# Patient Record
Sex: FEMALE | Race: OTHER | HISPANIC OR LATINO | URBAN - METROPOLITAN AREA
[De-identification: names, ages, dates, MRNs, and addresses within clinical notes are randomized per-mention and may not be internally consistent; named-entity substitution may affect disease eponyms.]

---

## 2023-06-06 ENCOUNTER — EMERGENCY (EMERGENCY)
Facility: HOSPITAL | Age: 33
LOS: 0 days | Discharge: HOME | End: 2023-06-07
Attending: EMERGENCY MEDICINE
Payer: MEDICAID

## 2023-06-06 VITALS
SYSTOLIC BLOOD PRESSURE: 138 MMHG | DIASTOLIC BLOOD PRESSURE: 83 MMHG | WEIGHT: 130.07 LBS | TEMPERATURE: 97 F | HEIGHT: 66 IN | HEART RATE: 104 BPM | RESPIRATION RATE: 18 BRPM | OXYGEN SATURATION: 99 %
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
## 2023-06-06 DIAGNOSIS — S39.012A STRAIN OF MUSCLE, FASCIA AND TENDON OF LOWER BACK, INITIAL ENCOUNTER: ICD-10-CM

## 2023-06-06 DIAGNOSIS — R35.0 FREQUENCY OF MICTURITION: ICD-10-CM

## 2023-06-06 DIAGNOSIS — M54.50 LOW BACK PAIN, UNSPECIFIED: ICD-10-CM
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
## 2023-06-06 DIAGNOSIS — R00.0 TACHYCARDIA, UNSPECIFIED: ICD-10-CM

## 2023-06-06 DIAGNOSIS — N39.0 URINARY TRACT INFECTION, SITE NOT SPECIFIED: ICD-10-CM

## 2023-06-06 DIAGNOSIS — R30.0 DYSURIA: ICD-10-CM

## 2023-06-06 PROCEDURE — 76770 US EXAM ABDO BACK WALL COMP: CPT

## 2023-06-06 PROCEDURE — 81001 URINALYSIS AUTO W/SCOPE: CPT

## 2023-06-06 PROCEDURE — 96372 THER/PROPH/DIAG INJ SC/IM: CPT

## 2023-06-06 PROCEDURE — 99284 EMERGENCY DEPT VISIT MOD MDM: CPT | Mod: 25

## 2023-06-06 PROCEDURE — 87086 URINE CULTURE/COLONY COUNT: CPT

## 2023-06-06 PROCEDURE — 99284 EMERGENCY DEPT VISIT MOD MDM: CPT

## 2023-06-06 NOTE — ED ADULT TRIAGE NOTE - CHIEF COMPLAINT QUOTE
pt presents to the ED in tears in a wheel chair c/o 10/10 back pain. as per pt pain began 1 week ago but has worsened. no injury or trauma reported. no hx of back issues. pt A&Ox4 - noted to be emotional

## 2023-06-07 VITALS
SYSTOLIC BLOOD PRESSURE: 128 MMHG | DIASTOLIC BLOOD PRESSURE: 68 MMHG | RESPIRATION RATE: 18 BRPM | OXYGEN SATURATION: 100 % | HEART RATE: 80 BPM | TEMPERATURE: 98 F

## 2023-06-07 LAB
APPEARANCE UR: CLEAR — SIGNIFICANT CHANGE UP
BACTERIA # UR AUTO: ABNORMAL
BILIRUB UR-MCNC: NEGATIVE — SIGNIFICANT CHANGE UP
COLOR SPEC: YELLOW — SIGNIFICANT CHANGE UP
DIFF PNL FLD: NEGATIVE — SIGNIFICANT CHANGE UP
EPI CELLS # UR: SIGNIFICANT CHANGE UP
GLUCOSE UR QL: NEGATIVE — SIGNIFICANT CHANGE UP
KETONES UR-MCNC: NEGATIVE — SIGNIFICANT CHANGE UP
LEUKOCYTE ESTERASE UR-ACNC: ABNORMAL
NITRITE UR-MCNC: NEGATIVE — SIGNIFICANT CHANGE UP
PH UR: 7 — SIGNIFICANT CHANGE UP (ref 5–8)
PROT UR-MCNC: NEGATIVE — SIGNIFICANT CHANGE UP
RBC CASTS # UR COMP ASSIST: SIGNIFICANT CHANGE UP /HPF (ref 0–4)
SP GR SPEC: 1.01 — SIGNIFICANT CHANGE UP (ref 1.01–1.02)
UROBILINOGEN FLD QL: NEGATIVE — SIGNIFICANT CHANGE UP
WBC UR QL: SIGNIFICANT CHANGE UP /HPF (ref 0–5)

## 2023-06-07 PROCEDURE — 76770 US EXAM ABDO BACK WALL COMP: CPT | Mod: 26

## 2023-06-07 RX ORDER — KETOROLAC TROMETHAMINE 30 MG/ML
30 SYRINGE (ML) INJECTION ONCE
Refills: 0 | Status: DISCONTINUED | OUTPATIENT
Start: 2023-06-07 | End: 2023-06-07

## 2023-06-07 RX ORDER — METHOCARBAMOL 500 MG/1
2 TABLET, FILM COATED ORAL
Qty: 24 | Refills: 0
Start: 2023-06-07 | End: 2023-06-10

## 2023-06-07 RX ORDER — ACETAMINOPHEN 500 MG
1000 TABLET ORAL ONCE
Refills: 0 | Status: COMPLETED | OUTPATIENT
Start: 2023-06-07 | End: 2023-06-07

## 2023-06-07 RX ORDER — CEPHALEXIN 500 MG
1 CAPSULE ORAL
Qty: 14 | Refills: 0
Start: 2023-06-07 | End: 2023-06-13

## 2023-06-07 RX ORDER — CEPHALEXIN 500 MG
500 CAPSULE ORAL ONCE
Refills: 0 | Status: COMPLETED | OUTPATIENT
Start: 2023-06-07 | End: 2023-06-07

## 2023-06-07 RX ORDER — PHENAZOPYRIDINE HCL 100 MG
1 TABLET ORAL
Qty: 6 | Refills: 0
Start: 2023-06-07 | End: 2023-06-09

## 2023-06-07 RX ORDER — METHOCARBAMOL 500 MG/1
1000 TABLET, FILM COATED ORAL ONCE
Refills: 0 | Status: COMPLETED | OUTPATIENT
Start: 2023-06-07 | End: 2023-06-07

## 2023-06-07 RX ADMIN — METHOCARBAMOL 1000 MILLIGRAM(S): 500 TABLET, FILM COATED ORAL at 02:54

## 2023-06-07 RX ADMIN — Medication 30 MILLIGRAM(S): at 02:11

## 2023-06-07 RX ADMIN — Medication 500 MILLIGRAM(S): at 03:39

## 2023-06-07 RX ADMIN — Medication 1000 MILLIGRAM(S): at 02:09

## 2023-06-07 NOTE — ED ADULT NURSE NOTE - NSFALLRISKINTERV_ED_ALL_ED
Assistance OOB with selected safe patient handling equipment if applicable/Assistance with ambulation/Communicate fall risk and risk factors to all staff, patient, and family/Monitor gait and stability/Provide visual cue: yellow wristband, yellow gown, etc/Reinforce activity limits and safety measures with patient and family/Call bell, personal items and telephone in reach/Instruct patient to call for assistance before getting out of bed/chair/stretcher/Non-slip footwear applied when patient is off stretcher/[place] to call system/Physically safe environment - no spills, clutter or unnecessary equipment/Purposeful Proactive Rounding/Room/bathroom lighting operational, light cord in reach

## 2023-06-07 NOTE — ED PROVIDER NOTE - PHYSICAL EXAMINATION
CONSTITUTIONAL: Well appearing, awake, alert, oriented to person, place, time/situation and in no apparent distress.  · ENMT: Airway patent, Nasal mucosa clear. Mouth with normal mucosa. Throat has no vesicles, no oropharyngeal exudates and uvula is midline.  · EYES: Clear bilaterally, pupils equal, round and reactive to light.  · CARDIAC: tachycardia,, regular rhythm.  Heart sounds S1, S2.  No murmurs, rubs or gallops.  · RESPIRATORY: Breath sounds clear and equal bilaterally.  · GASTROINTESTINAL: Abdomen soft, non-tender, no guarding.  · MUSCULOSKELETAL: Spine appears normal,   Lumbar spine range of motion is limited in flexion and extension.  The patient has pain with flexion, extension, sidebending and rotation.  The patient has decreased straight leg raise of the left lower extremity and pain with passive straight leg raise that is limited to the left lower back and buttocks.  · NEUROLOGICAL: Alert and oriented, no focal deficits, no motor or sensory deficits.  · SKIN: Skin normal color for race, warm, dry and intact. No evidence of rash

## 2023-06-07 NOTE — ED PROVIDER NOTE - NSFOLLOWUPCLINICS_GEN_ALL_ED_FT
[facility]  Family Medicine  [address] [place] [address]  Phone: [phone]  Fax:   Follow Up Time: 1-3 Days

## 2023-06-07 NOTE — ED PROVIDER NOTE - OBJECTIVE STATEMENT
32-year-old female no pertinent past medical or surgical history presents for progressively worsening low back pain over the past week.  Patient denies any recent trauma or heavy lifting.  The patient states that this has gradually worsened and is exacerbated by moving and over the past few hours she is unable to ambulate.  The patient denies any numbness/tingling, incontinence or urinary retention.  The patient does note some dysuria and frequency.  The patient states that she has some suprapubic pain as well.  Patient has had nausea without vomiting.  Patient has been afebrile.  Patient has no history of tobacco, alcohol or drug use.  Patient has not taken any medication for the pain in the last 24 hours.

## 2023-06-07 NOTE — ED ADULT NURSE NOTE - OBJECTIVE STATEMENT
Pt is a 32 YOF complaining of back pain general. Pt states "It really hurts on my L side I cant sit down". Pt is GCS 15, HEENT clear, PERRL, A & O x4, PMS x4. Pt endorses 7/10 pain. Pt reports dark urine. Pt denies chest pain SOB. Pt safety is maintained bed in lowest position semi fowlers with bed rails up.

## 2023-06-07 NOTE — ED PROVIDER NOTE - PATIENT PORTAL LINK FT
You can access the FollowMyHealth Patient Portal offered by [facility] by registering at the following website: [email or web address]. By joining [employer]’s FollowMyHealth portal, you will also be able to view your health information using other applications (apps) compatible with our system.

## 2023-06-07 NOTE — ED PROVIDER NOTE - CLINICAL SUMMARY MEDICAL DECISION MAKING FREE TEXT BOX
No red flags noted to suggest cauda equina syndrome, epidural abscess, or epidural hematoma.   Patient complaining of some dysuria and frequency and has mild suprapubic tenderness.  Will check urinalysis/culture for urinary tract infection and obtain ultrasound of kidneys and bladder.  Will treat pain with NSAIDs, muscle relaxers and reassess. Johnny Starr, DO

## 2023-06-07 NOTE — ED PROVIDER NOTE - NSFOLLOWUPINSTRUCTIONS_ED_ALL_ED_FT
Infección urinaria en los adultos  Urinary Tract Infection, Adult       Tresa infección urinaria (IU) puede ocurrir en cualquier lugar de las vías urinarias. Las vías urinarias incluyen a los riñones, los uréteres, la vejiga y la uretra. Estos órganos fabrican, almacenan y eliminan la orina del organismo.    El médico puede usar otras palabras para describir la infección. La IU antonino afecta los uréteres y a los riñones (pielonefritis). La IU baja afecta la vejiga (cistitis) y la uretra (uretritis).    ¿Cuáles son las causas?  La mayoría de las infecciones de las vías urinarias es causada por la presencia de bacterias en la boby genital, alrededor de la entrada de las vías urinarias (uretra). Estas bacterias proliferan y causan inflamación en las vías urinarias.    ¿Qué incrementa el riesgo?  Es más probable que contraiga esta afección si:    Tiene colocado un catéter urinario (sonda urinaria) permanente.  No puede controlar cuándo orinar o defecar (tiene incontinencia).  Es saloni y usted:    Utiliza espermicida o diafragma gatito método anticonceptivo.  Tiene niveles bajos de estrógenos.  Están embarazadas.  Tiene ciertos genes que aumentan duke riesgo (genética).  Es sexualmente activa.  Mercedes antibióticos.  Tiene tresa afección que causa que el flujo de orina sea lento, gatito:    Próstata agrandada, si usted es hombre.  Obstrucción de la uretra (estenosis).  Cálculo renal.  Tresa afección nerviosa que afecta el control de la vejiga (vejiga neurógena).  No ken lo suficiente o no orina con frecuencia.  Tiene ciertas enfermedades crónicas, gatito:    Diabetes.  Un sistema que combate las enfermedades (sistemainmunitario) debilitado.  Anemia drepanocítica.  Gota.  Lesión en la médula bermeo.    ¿Cuáles son los signos o los síntomas?  Los síntomas de esta afección incluyen:    Necesidad inmediata (urgente) de orinar.  Micción frecuente o eliminación de pequeñas cantidades de orina con frecuencia.  Ardor o dolor al orinar.  Presencia de joelle en la orina.  Orina con mal olor u olor atípico.  Dificultad para orinar.  Orina turbia.  Secreción vaginal, si es saloni.  Dolor en el abdomen o en la parte inferior de la espalda.    Es posible que también tenga:    Vómitos o disminución del apetito.  Confusión.  Irritabilidad o cansancio.  Fiebre.  Diarrea.    El primer síntoma en los adultos mayores puede ser la confusión. En algunos casos, es posible que no tengan síntomas hasta que la infección empeore.    ¿Cómo se diagnostica?  Esta afección se diagnostica en función de sandee antecedentes médicos y de un examen físico. También pueden hacerle otras pruebas, incluidas las siguientes:    Análisis de orina.  Análisis de joelle.  Pruebas de infecciones de transmisión sexual (ITS).    Si ha tenido más de tresa infección urinaria (IU), se pueden hacer estudios de diagnóstico por imágenes o tresa cistoscopia para determinar la causa de las infecciones.    ¿Cómo se trata?  El tratamiento de esta afección incluye lo siguiente:    Antibióticos.  Medicamentos de venta sada para aliviar las molestias.  Beber tresa cantidad suficiente agua para mantenerse hidratado.    Si tiene infecciones con frecuencia o tiene otras afecciones, gatito un cálculo renal, es posible que deba brice a un médico especialista en las vías urinarias (urólogo).    En casos poco frecuentes, las infecciones urinarias pueden provocar sepsis. La sepsis es tresa afección potencialmente mortal que se produce cuando el cuerpo responde a tresa infección. La sepsis se trata en el hospital con antibióticos, líquidos y otros medicamentos que se administran por vía intravenosa.    Sigue estas instrucciones en tu casa:         Medicamentos    Mercedes los medicamentos de venta sada y los recetados solamente gatito se lo haya indicado el médico.  Si le recetaron un antibiótico, tómelo gatito se lo haya indicado el médico. No deje de usar el antibiótico aunque comience a sentirse mejor.        Indicaciones generales    Asegúrese de hacer lo siguiente:    Vaciar la vejiga con frecuencia y en duke totalidad. No contener la orina magnus largos períodos.  Vaciar la vejiga después de tener sexo.  Limpiarse de adelante hacia atrás después de defecar, si es saloni. Use cada trozo de papel higiénico solo tresa vez cuando se limpie.  Amy suficiente líquido gatito para mantener la orina de color amarillo pálido.  Concurrir a todas las visitas de seguimiento gatito se lo haya indicado el médico. [place] es importante.    Comunícate con un médico si:  Los síntomas no mejoran después de 1 o 2 días de tratamiento.  Los síntomas desaparecen y luego vuelven a aparecer.    Solicite ayuda inmediatamente si tiene:  Dolor intenso en la espalda o en la parte inferior del abdomen.  Fiebre.  Náuseas o vómitos.    Resumen  Tresa infección urinaria (IU) es tresa infección en cualquier parte de las vías urinarias, que incluyen los riñones, los uréteres, la vejiga y la uretra.  La mayoría de las infecciones de las vías urinarias es causada por la presencia de bacterias en la boby genital, alrededor de la entrada de las vías urinarias (uretra).  El tratamiento de esta afección suele incluir antibióticos.  Si le recetaron un antibiótico, tómelo gatito se lo haya indicado el médico. No deje de usar el antibiótico aunque comience a sentirse mejor.  Concurrir a todas las visitas de seguimiento gatito se lo haya indicado el médico. [place] es importante.    NOTAS ADICIONALES E INSTRUCCIONES    Please follow up with your Primary MD in 24-48 hr.  Seek immediate medical care for any new/worsening signs or symptoms.

## 2023-06-08 LAB
CULTURE RESULTS: SIGNIFICANT CHANGE UP
SPECIMEN SOURCE: SIGNIFICANT CHANGE UP

## 2023-12-06 ENCOUNTER — EMERGENCY (EMERGENCY)
Facility: HOSPITAL | Age: 33
LOS: 0 days | Discharge: HOME | End: 2023-12-06
Attending: EMERGENCY MEDICINE
Payer: MEDICAID

## 2023-12-06 VITALS
SYSTOLIC BLOOD PRESSURE: 124 MMHG | RESPIRATION RATE: 18 BRPM | DIASTOLIC BLOOD PRESSURE: 90 MMHG | OXYGEN SATURATION: 98 % | HEART RATE: 80 BPM | TEMPERATURE: 99 F

## 2023-12-06 VITALS — WEIGHT: 138.01 LBS | HEIGHT: 62.99 IN

## 2023-12-06 DIAGNOSIS — E66.9 OBESITY, UNSPECIFIED: ICD-10-CM

## 2023-12-06 DIAGNOSIS — M54.50 LOW BACK PAIN, UNSPECIFIED: ICD-10-CM

## 2023-12-06 PROBLEM — Z78.9 OTHER SPECIFIED HEALTH STATUS: Chronic | Status: ACTIVE | Noted: 2023-06-07

## 2023-12-06 LAB
APPEARANCE UR: CLEAR — SIGNIFICANT CHANGE UP
APPEARANCE UR: CLEAR — SIGNIFICANT CHANGE UP
BILIRUB UR-MCNC: NEGATIVE — SIGNIFICANT CHANGE UP
BILIRUB UR-MCNC: NEGATIVE — SIGNIFICANT CHANGE UP
COLOR SPEC: YELLOW — SIGNIFICANT CHANGE UP
COLOR SPEC: YELLOW — SIGNIFICANT CHANGE UP
DIFF PNL FLD: NEGATIVE — SIGNIFICANT CHANGE UP
DIFF PNL FLD: NEGATIVE — SIGNIFICANT CHANGE UP
GLUCOSE UR QL: NEGATIVE MG/DL — SIGNIFICANT CHANGE UP
GLUCOSE UR QL: NEGATIVE MG/DL — SIGNIFICANT CHANGE UP
KETONES UR-MCNC: NEGATIVE MG/DL — SIGNIFICANT CHANGE UP
KETONES UR-MCNC: NEGATIVE MG/DL — SIGNIFICANT CHANGE UP
LEUKOCYTE ESTERASE UR-ACNC: NEGATIVE — SIGNIFICANT CHANGE UP
LEUKOCYTE ESTERASE UR-ACNC: NEGATIVE — SIGNIFICANT CHANGE UP
NITRITE UR-MCNC: NEGATIVE — SIGNIFICANT CHANGE UP
NITRITE UR-MCNC: NEGATIVE — SIGNIFICANT CHANGE UP
PH UR: 6.5 — SIGNIFICANT CHANGE UP (ref 5–8)
PH UR: 6.5 — SIGNIFICANT CHANGE UP (ref 5–8)
PROT UR-MCNC: NEGATIVE MG/DL — SIGNIFICANT CHANGE UP
PROT UR-MCNC: NEGATIVE MG/DL — SIGNIFICANT CHANGE UP
SP GR SPEC: 1.02 — SIGNIFICANT CHANGE UP (ref 1–1.03)
SP GR SPEC: 1.02 — SIGNIFICANT CHANGE UP (ref 1–1.03)
UROBILINOGEN FLD QL: 1 MG/DL — SIGNIFICANT CHANGE UP (ref 0.2–1)
UROBILINOGEN FLD QL: 1 MG/DL — SIGNIFICANT CHANGE UP (ref 0.2–1)

## 2023-12-06 PROCEDURE — 99284 EMERGENCY DEPT VISIT MOD MDM: CPT

## 2023-12-06 PROCEDURE — 81003 URINALYSIS AUTO W/O SCOPE: CPT

## 2023-12-06 PROCEDURE — 99283 EMERGENCY DEPT VISIT LOW MDM: CPT

## 2023-12-06 PROCEDURE — 87086 URINE CULTURE/COLONY COUNT: CPT

## 2023-12-06 RX ORDER — CYCLOBENZAPRINE HYDROCHLORIDE 10 MG/1
1 TABLET, FILM COATED ORAL
Qty: 15 | Refills: 0
Start: 2023-12-06 | End: 2023-12-10

## 2023-12-06 RX ORDER — IBUPROFEN 200 MG
600 TABLET ORAL ONCE
Refills: 0 | Status: COMPLETED | OUTPATIENT
Start: 2023-12-06 | End: 2023-12-06

## 2023-12-06 RX ORDER — IBUPROFEN 200 MG
1 TABLET ORAL
Qty: 16 | Refills: 0
Start: 2023-12-06 | End: 2023-12-09

## 2023-12-06 RX ORDER — CYCLOBENZAPRINE HYDROCHLORIDE 10 MG/1
5 TABLET, FILM COATED ORAL ONCE
Refills: 0 | Status: COMPLETED | OUTPATIENT
Start: 2023-12-06 | End: 2023-12-06

## 2023-12-06 RX ADMIN — CYCLOBENZAPRINE HYDROCHLORIDE 5 MILLIGRAM(S): 10 TABLET, FILM COATED ORAL at 18:59

## 2023-12-06 RX ADMIN — Medication 600 MILLIGRAM(S): at 18:59

## 2023-12-06 NOTE — ED STATDOCS - MUSCULOSKELETAL, MLM
L sciatic notch TTP. No midline back pain. SLR 35 degrees w/ pain lower back radiating to the LLE. No obvious hip swelling or TTP. No LE focal TTP or swelling. + Tender L para-lumbar mm. & L sciatic notch. No midline back pain. SLR 35 degrees w/ pain lower back radiating into LLE. No obvious L hip swelling or TTP. No LE focal TTP or swelling.

## 2023-12-06 NOTE — ED STATDOCS - ATTENDING APP SHARED VISIT CONTRIBUTION OF CARE
ED Attending Contribution to Care: VERNON HOYT MD,  performed the initial face to face bedside interview with this patient regarding history of present illness, review of symptoms and relevant past medical, social and family history.  I completed an independent physical examination.  I was the initial provider who evaluated this patient. I have signed out the follow up of any pending tests (i.e. labs, radiological studies) to the MALLY.  I have communicated the patient’s plan of care and disposition with the MALLY.  The history, relevant review of systems, past medical and surgical history, medical decision making, and physical examination was documented by the scribe in my presence and I attest to the accuracy of the documentation.

## 2023-12-06 NOTE — ED STATDOCS - CONSTITUTIONAL, MLM
well appearing and in no apparent distress. normal... Obese HF, no respiratory discomfort.  + Mild pain d/t LBP.

## 2023-12-06 NOTE — ED STATDOCS - OBJECTIVE STATEMENT
33y Greenlandic speaking female w/o presents to the ED c/o L lower back/buttock pain radiating to the L lateral LE for the past week w/o known trauma/injury. Denies heavy lifting. States that the pain is aggravated by moving, standing, and walking. Denies distal numbness, weakness, or LE giving out. Denies fever, urinary symptoms, chills, abdominal pain, or n/v. Friend at bedside states patient diagnosed w/ UTI w/ similar symptom presentation. 33y Bengali speaking female w/o presents to the ED c/o L lower back/buttock pain radiating to the L lateral LE for the past week w/o known trauma/injury. Denies heavy lifting. States that the pain is aggravated by moving, standing, and walking. Denies distal numbness, weakness, or LE giving out. Denies fever, urinary symptoms, chills, abdominal pain, or n/v. Friend at bedside states patient diagnosed w/ UTI w/ similar symptom presentation. 33y Yi speaking HF w/o PMH, BIB pvt car to the ED c/o L lower back/buttock pain radiating along lateral LLE for the past week w/o known trauma/injury. Denies heavy lifting. States that the pain is aggravated by moving, standing, and walking. Denies distal numbness, weakness, or LE giving out. Denies fever, urinary symptoms, chills, abdominal pain, B/B incontinence, nor n/v. Friend at bedside states patient previously diagnosed w/ UTI w/ similar symptoms  presentation. 33y Frisian speaking HF w/o PMH, BIB pvt car to the ED c/o L lower back/buttock pain radiating along lateral LLE for the past week w/o known trauma/injury. Denies heavy lifting. States that the pain is aggravated by moving, standing, and walking. Denies distal numbness, weakness, or LE giving out. Denies fever, urinary symptoms, chills, abdominal pain, B/B incontinence, nor n/v. Friend at bedside states patient previously diagnosed w/ UTI w/ similar symptoms  presentation.

## 2023-12-06 NOTE — ED ADULT TRIAGE NOTE - CHIEF COMPLAINT QUOTE
presents to ER with c/o pain in left leg and hip. denies known trauma or injury. pain is present at rest, alleviated upon movement.

## 2023-12-06 NOTE — ED STATDOCS - CARDIAC, MLM
normal rate, regular rhythm, and no murmur. normal rate, regular rhythm, and no murmur.  Normal radial pulse.

## 2023-12-06 NOTE — ED ADULT TRIAGE NOTE - PATIENT/CAREGIVER ACCEPTED INTERPRETER SERVICES
Date of Service: 01/09/2018    HISTORY OF PRESENT ILLNESS:  I saw Tona Moy in followup.  She is sitting in the chair and does not appear to be in any discomfort.  She has not had any further spells concerning for seizures since my last evaluation.  She was seen by a speech pathologist and started on a puree diet and honey-thick liquids.  The patient denies any headaches.  She denies any significant weakness.  She wants to go home.  Per patient's nurse, she is able to transfer from wheelchair to the bed.  The patient's daughter is at bedside, who reported that patient can be really cochran and stubborn at times.  The patient is asking whether I can continue following her up in [place], where she lives.  She had received another dose of Keppra this morning, 500 mg intravenously, and has been tolerating this pretty well.  She has been afebrile.    VITAL SIGNS:    Blood pressure is 104/60 mmHg, pulse is 78 per minute, temperature 98.2 degrees Fahrenheit.    MEDICATIONS:  1.  Lipitor.  2.  Coreg.  3.  Senokot.  4.  Keppra 500 mg twice a day.  5.  Synthroid.  6.  Nystatin.    NEUROLOGIC EXAMINATION:  The patient is awake, alert, oriented times hospital.  She is able to tell me her correct date of birth.  She is able to name simple objects.  She is slow in processing information.  Pupils are 2 mm, round, reactive to light.  Visual fields are full to finger confrontation.  Extraocular movements are intact.  There is questionable visual neglect noted on the left.  No significant nystagmus noted.  Nasolabial folds are essentially symmetric.  Tongue is midline.  There is no pronator drift noted.  She did not give good effort to really great strength in upper and lower extremities but grossly she is moving both upper and lower extremities pretty well.  She had some difficulty following commands for finger-to-nose examination.  With 2-hand assist, she is able to get up and walk a few steps.  Her gait is slightly cautious.  I  noted mild leaning towards the left.    ASSESSMENT AND PLAN:  Tona Moy is a pleasant 80-year-old  woman with a large right subdural hematoma, status post craniotomy for evacuation.  Yesterday she had a witnessed generalized tonic-clonic seizure.  She was loaded with IV Keppra and started on maintenance dose of 500 mg twice a day of this morning, which she is tolerating pretty well.  She has not had any further seizures since yesterday evening.  No side effects to Keppra at this point in time.  Her neurological examination as described above with mild slowing in processing information, mild left visual neglect, and mild leaning towards the left during ambulation.  She probably has very mild weakness on the left side and I could not grade strength because of patient not being really cooperative with motor evaluation.      I have discussed at length with the patient's daughter, who is present at the bedside about the pathophysiology, prognosis, and treatment options for subdural hematoma.  She has a history of atrial fibrillation.  Given risk of falls and left subdural hematoma requiring craniotomy for evacuation, she is a high risk candidate for long-term anticoagulation therapy.  If this subdural hematoma completely resolves, then she can be started on antiplatelet therapy in about 3 months, and possibly in the future, dual antiplatelet, but I would not suggest Warfarin/noval anticoagulant in the future.  She probably needs to continue on Keppra for about 1-2 years.  If she happens to experience any further seizures while she is in the hospital, then I would consider video EEG monitoring and possibly increasing the dose of Keppra/adding another anticonvulsant.    Thank you for the opportunity to continue to participate in the care of this nice woman.  Above findings are also discussed with the patient's nurse, who was present at the bedside.      Dictated By: Jacob Broussard MD  Signing Provider: Jacob JONES  MD RAYRAY Broussard/ROMAINE ([id number])  DD: 01/09/2018 18:41:26 TD: 01/09/2018 18:59:21    Copy Sent To:      yes

## 2023-12-06 NOTE — ED STATDOCS - SKIN, MLM
skin normal color for race, warm, dry and intact. skin normal color for race, warm, dry and intact.  No tactile warmth.

## 2023-12-06 NOTE — ED STATDOCS - CLINICAL SUMMARY MEDICAL DECISION MAKING FREE TEXT BOX
33y Wolof speaking female w/ L lower back/buttock pain radiating to the L lateral LE for the past week w/o known trauma/injury. Plan urine for UCG, UA w/ culture, PO Motrin/Flexiril. Observe and reassess, plan for ambulation trial. 33y Telugu speaking female w/ L lower back/buttock pain radiating to the L lateral LE for the past week w/o known trauma/injury. Plan urine for UCG, UA w/ culture, PO Motrin/Flexiril. Observe and reassess, plan for ambulation trial. 33y Hungarian speaking female w/ L lower back/buttock pain radiating along lateral LLE for the past week w/o known trauma/injury.   Plan: urine for UCG, UA w/ culture, PO Motrin/Flexiril. Observe and reassess, plan for ambulation trial.    See Progress Notes for ED PA's case progression incl. labs review & pt reassessment, disposition. 33y Frisian speaking female w/ L lower back/buttock pain radiating along lateral LLE for the past week w/o known trauma/injury.   Plan: urine for UCG, UA w/ culture, PO Motrin/Flexiril. Observe and reassess, plan for ambulation trial.    See Progress Notes for ED PA's case progression incl. labs review & pt reassessment, disposition.

## 2023-12-06 NOTE — ED STATDOCS - PATIENT PORTAL LINK FT
You can access the FollowMyHealth Patient Portal offered by [facility] by registering at the following website: [email or web address]. By joining [employer]’s FollowMyHealth portal, you will also be able to view your health information using other applications (apps) compatible with our system. You can access the FollowMyHealth Patient Portal offered by [facility] by registering at the following website: [email or web address]. By joining [employer]’s FollowMyHealth portal, you will also be able to view your health information using other applications (apps) compatible with our system.

## 2023-12-06 NOTE — ED ADULT NURSE NOTE - NSFALLUNIVINTERV_ED_ALL_ED
Bed/Stretcher in lowest position, wheels locked, appropriate side rails in place/Call bell, personal items and telephone in reach/Instruct patient to call for assistance before getting out of bed/chair/stretcher/Non-slip footwear applied when patient is off stretcher/[place] to call system/Physically safe environment - no spills, clutter or unnecessary equipment/Purposeful proactive rounding/Room/bathroom lighting operational, light cord in reach Bed/Stretcher in lowest position, wheels locked, appropriate side rails in place/Call bell, personal items and telephone in reach/Instruct patient to call for assistance before getting out of bed/chair/stretcher/Non-slip footwear applied when patient is off stretcher/[place] to call system/Physically safe environment - no spills, clutter or unnecessary equipment/Purposeful proactive rounding/Room/bathroom lighting operational, light cord in reach

## 2023-12-06 NOTE — ED ADULT NURSE NOTE - OBJECTIVE STATEMENT
presents to ER with c/o pain in left leg and hip. denies known trauma or injury. pain is present at rest, alleviated upon movement. no s/s of distress. Pt A&ox4, ambulatory.

## 2023-12-06 NOTE — ED STATDOCS - RESPIRATORY, MLM
breath sounds clear and equal bilaterally. breath sounds clear and equal bilaterally.  Normal respirations.

## 2023-12-06 NOTE — ED STATDOCS - NSFOLLOWUPINSTRUCTIONS_ED_ALL_ED_FT
Ciática  Sciatica  Back view of a person showing a normal leg and a leg affected by the pain of sciatica.  La ciática es el dolor, entumecimiento, debilidad u hormigueo a lo nereyda del nervio ciático. El nervio ciático comienza en la parte inferior de la espalda y desciende por la parte posterior de cada pierna. Controla los músculos en la parte inferior de las piernas y en la parte posterior de las rodillas. También proporciona sensibilidad a la parte posterior de los muslos, la parte inferior de las piernas y la planta de los pies. La ciática es un síntoma de otra afección que ejerce presión o “pellizca” el nervio ciático.    Con mayor frecuencia, la ciática afecta a un solo lado del cuerpo. Suele desaparecer por sí phong o con tratamiento. En algunos casos, la ciática puede volver (ser recurrente).    ¿Cuáles son las causas?  Esta afección es causada por nohemi presión sobre el nervio ciático o “pellizco” del nervio ciático. [place] puede ser el resultado de:  Un disco que sobresale demasiado entre los huesos de la columna vertebral (hernia de disco).  Cambios en los discos vertebrales relacionados con la edad.  Un trastorno doloroso que afecta un músculo de las nalgas.  Un crecimiento óseo adicional cerca del nervio ciático.  Nohemi rotura (fractura) de la pelvis.  Embarazo.  Tumor. [place] es poco frecuente.  ¿Qué incrementa el riesgo?  Los siguientes factores pueden hacer que sea más propenso a desarrollar esta afección:  Practicar deportes que ponen presión o tensión sobre la columna vertebral.  Tener poca fuerza y flexibilidad.  Antecedentes de cirugía o lesión en la espalda.  Estar sentado lizette largos períodos de tiempo.  Realizar actividades que requieren agacharse o levantar objetos en forma repetida.  Obesidad.  ¿Cuáles son los signos o síntomas?  Los síntomas pueden variar de leves a muy graves. Pueden incluir los siguientes:  Cualquiera de los siguientes problemas en la parte inferior de la espalda, las piernas, la cadera o las nalgas:  Hormigueo leve, adormecimiento o dolor sordo.  Sensación de ardor.  Dolor rose.  Adormecimiento de la parte posterior de la pantorrilla o la planta del pie.  Debilidad en las piernas.  Dolor de espalda intenso que dificulta el movimiento.  Los síntomas podrían empeorar al toser, estornudar o reírse, o cuando se está sentado o de pie lizette períodos prolongados.    ¿Cómo se diagnostica?  Esta afección se puede diagnosticar en función de lo siguiente:  Los síntomas y los antecedentes médicos.  Un examen físico.  Pruebas de joelle.  Pruebas de diagnóstico por imágenes, por ejemplo:  Radiografías.  Nohemi resonancia magnética (RM).  Nohemi exploración por tomografía computarizada (TC).  ¿Cómo se trata?  En muchos casos, esta afección mejora por sí phong, sin tratamiento. Sin embargo, el tratamiento puede incluir:  Reducción o modificación la actividad física.  Hacer ejercicio, incluido el fortalecimiento y la elongación.  Aplicación de calor o hielo en la boby afectada.  Medicamentos para lo siguiente:  Aliviar el dolor y la inflamación.  Relajan los músculos.  Medicamentos inyectables que ayudan a aliviar el dolor y la inflamación (corticoesteroides) alrededor del nervio ciático.  Cirugía.  Siga estas instrucciones en [facility]:  Medicamentos    Use los medicamentos de venta sada y los recetados solamente gatito se lo haya indicado el médico.  Pregúntele al médico si el medicamento recetado le impide conducir o usar maquinaria pesada.  Control del dolor    Bag of ice on a towel on the skin.  A heating pad for use on the affected area.  Si se lo indican, aplique hielo sobre la boby afectada. Para hacer esto:  Ponga el hielo en noehmi bolsa plástica.  Coloque nohemi toalla entre la piel y la bolsa.  Aplique el hielo lizette 20 minutos, 2 a 3 veces por día.  Si la piel se le pone de color marion brillante, retire el hielo de inmediato para evitar daños en la piel. El riesgo de daño en la piel es mayor si no puede sentir dolor, calor o frío.  Si se lo indican, aplique calor en la boby afectada con la frecuencia que le haya indicado el médico. Use la rambo de calor que el médico le recomiende, gatito nohemi compresa de calor húmedo o nohemi almohadilla térmica.  Coloque nohemi toalla entre la piel y la rambo de calor.  Aplique calor lizette 20 a 30 minutos.  Si la piel se le pone de color marion brillante, retire el calor de inmediato para evitar quemaduras. El riesgo de quemaduras es mayor si no puede sentir el dolor, el calor o el frío.  Actividad    A comparison showing the right and wrong way to lift a heavy object.  Retome sandee actividades normales gatito se lo haya indicado el médico. Pregúntele al médico qué actividades son seguras para usted.  Evite las actividades que empeoran los síntomas.  Lizette el día, descanse lizette lapsos breves.  Cuando descanse lizette períodos más largos, incorpore alguna actividad suave o ejercicios de elongación entre los períodos de descanso. [place] ayudará a evitar la rigidez y el dolor.  Evite estar sentado lizette largos períodos de tiempo sin moverse. [place] y muévase al menos nohemi vez cada hora.  Shoaib ejercicio y elongue con regularidad, gatito se lo haya indicado el médico.  No levante objetos que pesen más de 10 libras (4.5 kg) hasta que el médico le diga que es seguro. Aunque no tenga síntomas, evite levantar objetos pesados, en especial en forma repetida.  Siempre use las técnicas de levantamiento correctas para levantar objetos, entre ellas:  Flexionar las rodillas.  Mantener la carga cerca del cuerpo.  No torcerse.  Instrucciones generales    Mantenga un peso saludable. El exceso de peso ejerce tensión adicional sobre la espalda.  Use calzado con buen apoyo y cómodo. Evite usar tacones.  Evite dormir sobre un colchón que sea demasiado blando o demasiado gerald. Un colchón que ofrezca un apoyo suficientemente firme para [facility] espalda al dormir puede ayudar a aliviar el dolor.  Comuníquese con un médico si:  El dolor no se barbara con los medicamentos.  El dolor no mejora o empeora.  Los síntomas griggs más de 4 semanas.  Baja de peso sin causa aparente.  Solicite ayuda de inmediato si:  No puede controlar cuándo orinar o defecar (incontinencia).  Tiene lo siguiente:  Debilidad que empeora en la parte inferior de la espalda, la pelvis, las nalgas o las piernas.  Enrojecimiento o inflamación en la espalda.  Sensación de ardor al orinar.  Resumen  La ciática es dolor, entumecimiento, debilidad u hormigueo a lo nereyda del trayecto del nervio ciático, que puede incluir la parte inferior de la espalda, las piernas, las caderas y las nalgas.  Esta afección es causada por nohemi presión sobre el nervio ciático o “pellizco” del nervio ciático.  El tratamiento a menudo incluye reposo, ejercicios, medicamentos y aplicar hielo o calor.  Esta información no tiene gatito fin reemplazar el consejo del médico. Asegúrese de hacerle al médico cualquier pregunta que tenga. Ciática  Sciatica  Back view of a person showing a normal leg and a leg affected by the pain of sciatica.  La ciática es el dolor, entumecimiento, debilidad u hormigueo a lo nereyda del nervio ciático. El nervio ciático comienza en la parte inferior de la espalda y desciende por la parte posterior de cada pierna. Controla los músculos en la parte inferior de las piernas y en la parte posterior de las rodillas. También proporciona sensibilidad a la parte posterior de los muslos, la parte inferior de las piernas y la planta de los pies. La ciática es un síntoma de otra afección que ejerce presión o “pellizca” el nervio ciático.    Con mayor frecuencia, la ciática afecta a un solo lado del cuerpo. Suele desaparecer por sí phong o con tratamiento. En algunos casos, la ciática puede volver (ser recurrente).    ¿Cuáles son las causas?  Esta afección es causada por nohemi presión sobre el nervio ciático o “pellizco” del nervio ciático. [place] puede ser el resultado de:  Un disco que sobresale demasiado entre los huesos de la columna vertebral (hernia de disco).  Cambios en los discos vertebrales relacionados con la edad.  Un trastorno doloroso que afecta un músculo de las nalgas.  Un crecimiento óseo adicional cerca del nervio ciático.  Nohemi rotura (fractura) de la pelvis.  Embarazo.  Tumor. [place] es poco frecuente.  ¿Qué incrementa el riesgo?  Los siguientes factores pueden hacer que sea más propenso a desarrollar esta afección:  Practicar deportes que ponen presión o tensión sobre la columna vertebral.  Tener poca fuerza y flexibilidad.  Antecedentes de cirugía o lesión en la espalda.  Estar sentado lizette largos períodos de tiempo.  Realizar actividades que requieren agacharse o levantar objetos en forma repetida.  Obesidad.  ¿Cuáles son los signos o síntomas?  Los síntomas pueden variar de leves a muy graves. Pueden incluir los siguientes:  Cualquiera de los siguientes problemas en la parte inferior de la espalda, las piernas, la cadera o las nalgas:  Hormigueo leve, adormecimiento o dolor sordo.  Sensación de ardor.  Dolor rose.  Adormecimiento de la parte posterior de la pantorrilla o la planta del pie.  Debilidad en las piernas.  Dolor de espalda intenso que dificulta el movimiento.  Los síntomas podrían empeorar al toser, estornudar o reírse, o cuando se está sentado o de pie lizette períodos prolongados.    ¿Cómo se diagnostica?  Esta afección se puede diagnosticar en función de lo siguiente:  Los síntomas y los antecedentes médicos.  Un examen físico.  Pruebas de joelle.  Pruebas de diagnóstico por imágenes, por ejemplo:  Radiografías.  Nohemi resonancia magnética (RM).  Nohemi exploración por tomografía computarizada (TC).  ¿Cómo se trata?  En muchos casos, esta afección mejora por sí phong, sin tratamiento. Sin embargo, el tratamiento puede incluir:  Reducción o modificación la actividad física.  Hacer ejercicio, incluido el fortalecimiento y la elongación.  Aplicación de calor o hielo en la boby afectada.  Medicamentos para lo siguiente:  Aliviar el dolor y la inflamación.  Relajan los músculos.  Medicamentos inyectables que ayudan a aliviar el dolor y la inflamación (corticoesteroides) alrededor del nervio ciático.  Cirugía.  Siga estas instrucciones en [facility]:  Medicamentos    Use los medicamentos de venta sada y los recetados solamente gatito se lo haya indicado el médico.  Pregúntele al médico si el medicamento recetado le impide conducir o usar maquinaria pesada.  Control del dolor    Bag of ice on a towel on the skin.  A heating pad for use on the affected area.  Si se lo indican, aplique hielo sobre la boby afectada. Para hacer esto:  Ponga el hielo en nohemi bolsa plástica.  Coloque nohemi toalla entre la piel y la bolsa.  Aplique el hielo lizette 20 minutos, 2 a 3 veces por día.  Si la piel se le pone de color marion brillante, retire el hielo de inmediato para evitar daños en la piel. El riesgo de daño en la piel es mayor si no puede sentir dolor, calor o frío.  Si se lo indican, aplique calor en la boby afectada con la frecuencia que le haya indicado el médico. Use la rambo de calor que el médico le recomiende, gatito nohemi compresa de calor húmedo o nohemi almohadilla térmica.  Coloque nohemi toalla entre la piel y la rambo de calor.  Aplique calor lizette 20 a 30 minutos.  Si la piel se le pone de color marion brillante, retire el calor de inmediato para evitar quemaduras. El riesgo de quemaduras es mayor si no puede sentir el dolor, el calor o el frío.  Actividad    A comparison showing the right and wrong way to lift a heavy object.  Retome sandee actividades normales gatito se lo haya indicado el médico. Pregúntele al médico qué actividades son seguras para usted.  Evite las actividades que empeoran los síntomas.  Lizette el día, descanse lizette lapsos breves.  Cuando descanse lizette períodos más largos, incorpore alguna actividad suave o ejercicios de elongación entre los períodos de descanso. [place] ayudará a evitar la rigidez y el dolor.  Evite estar sentado lizette largos períodos de tiempo sin moverse. [place] y muévase al menos nohemi vez cada hora.  Shoaib ejercicio y elongue con regularidad, gatito se lo haya indicado el médico.  No levante objetos que pesen más de 10 libras (4.5 kg) hasta que el médico le diga que es seguro. Aunque no tenga síntomas, evite levantar objetos pesados, en especial en forma repetida.  Siempre use las técnicas de levantamiento correctas para levantar objetos, entre ellas:  Flexionar las rodillas.  Mantener la carga cerca del cuerpo.  No torcerse.  Instrucciones generales    Mantenga un peso saludable. El exceso de peso ejerce tensión adicional sobre la espalda.  Use calzado con buen apoyo y cómodo. Evite usar tacones.  Evite dormir sobre un colchón que sea demasiado blando o demasiado gerald. Un colchón que ofrezca un apoyo suficientemente firme para [facility] espalda al dormir puede ayudar a aliviar el dolor.  Comuníquese con un médico si:  El dolor no se barbara con los medicamentos.  El dolor no mejora o empeora.  Los síntomas griggs más de 4 semanas.  Baja de peso sin causa aparente.  Solicite ayuda de inmediato si:  No puede controlar cuándo orinar o defecar (incontinencia).  Tiene lo siguiente:  Debilidad que empeora en la parte inferior de la espalda, la pelvis, las nalgas o las piernas.  Enrojecimiento o inflamación en la espalda.  Sensación de ardor al orinar.  Resumen  La ciática es dolor, entumecimiento, debilidad u hormigueo a lo nereyda del trayecto del nervio ciático, que puede incluir la parte inferior de la espalda, las piernas, las caderas y las nalgas.  Esta afección es causada por nohemi presión sobre el nervio ciático o “pellizco” del nervio ciático.  El tratamiento a menudo incluye reposo, ejercicios, medicamentos y aplicar hielo o calor.  Esta información no tiene gatito fin reemplazar el consejo del médico. Asegúrese de hacerle al médico cualquier pregunta que tenga.

## 2023-12-08 LAB
CULTURE RESULTS: SIGNIFICANT CHANGE UP
CULTURE RESULTS: SIGNIFICANT CHANGE UP
SPECIMEN SOURCE: SIGNIFICANT CHANGE UP
SPECIMEN SOURCE: SIGNIFICANT CHANGE UP
